# Patient Record
Sex: MALE | Race: WHITE | Employment: FULL TIME | URBAN - METROPOLITAN AREA
[De-identification: names, ages, dates, MRNs, and addresses within clinical notes are randomized per-mention and may not be internally consistent; named-entity substitution may affect disease eponyms.]

---

## 2019-05-15 ENCOUNTER — HOSPITAL ENCOUNTER (EMERGENCY)
Age: 54
Discharge: HOME | End: 2019-05-15
Attending: EMERGENCY MEDICINE
Payer: COMMERCIAL

## 2019-05-15 VITALS
HEIGHT: 75 IN | BODY MASS INDEX: 27.48 KG/M2 | SYSTOLIC BLOOD PRESSURE: 126 MMHG | HEART RATE: 75 BPM | DIASTOLIC BLOOD PRESSURE: 81 MMHG | RESPIRATION RATE: 18 BRPM | TEMPERATURE: 97.9 F | OXYGEN SATURATION: 92 % | WEIGHT: 221 LBS

## 2019-05-15 DIAGNOSIS — S05.02XA ABRASION OF LEFT CORNEA, INITIAL ENCOUNTER: Primary | ICD-10-CM

## 2019-05-15 PROCEDURE — 99282 EMERGENCY DEPT VISIT SF MDM: CPT

## 2019-05-15 PROCEDURE — 6370000000 HC RX 637 (ALT 250 FOR IP): Performed by: NURSE PRACTITIONER

## 2019-05-15 RX ORDER — OXYCODONE HYDROCHLORIDE AND ACETAMINOPHEN 5; 325 MG/1; MG/1
1 TABLET ORAL EVERY 6 HOURS PRN
Qty: 20 TABLET | Refills: 0 | Status: SHIPPED | OUTPATIENT
Start: 2019-05-15 | End: 2019-05-22

## 2019-05-15 RX ORDER — SULFACETAMIDE SODIUM 100 MG/ML
2 SOLUTION/ DROPS OPHTHALMIC
Qty: 1 BOTTLE | Refills: 0 | Status: SHIPPED | OUTPATIENT
Start: 2019-05-15 | End: 2019-05-25

## 2019-05-15 RX ORDER — OXYCODONE HYDROCHLORIDE AND ACETAMINOPHEN 5; 325 MG/1; MG/1
2 TABLET ORAL ONCE
Status: COMPLETED | OUTPATIENT
Start: 2019-05-15 | End: 2019-05-15

## 2019-05-15 RX ADMIN — OXYCODONE AND ACETAMINOPHEN 2 TABLET: 5; 325 TABLET ORAL at 21:40

## 2019-05-15 ASSESSMENT — VISUAL ACUITY
OS: 20/30
OD: 20/30
OU: 20/30

## 2019-05-15 ASSESSMENT — ENCOUNTER SYMPTOMS
PHOTOPHOBIA: 0
EYE REDNESS: 1
COLOR CHANGE: 0
EYE DISCHARGE: 0
EYE PAIN: 1
EYE ITCHING: 0

## 2019-05-15 ASSESSMENT — PAIN SCALES - GENERAL: PAINLEVEL_OUTOF10: 10

## 2019-05-15 ASSESSMENT — PAIN DESCRIPTION - PAIN TYPE: TYPE: ACUTE PAIN

## 2019-05-15 ASSESSMENT — PAIN DESCRIPTION - LOCATION: LOCATION: EYE

## 2019-05-15 ASSESSMENT — PAIN DESCRIPTION - ORIENTATION: ORIENTATION: LEFT

## 2019-05-16 NOTE — ED NOTES
Pt presents to ED ambulatory with steady gait c/o of poking himself with a stick to his left eye. Pt rates pain 10/10. Redness noted with clear, watery drainage.        Shahnaz Quinn RN  05/15/19 2121

## 2019-05-16 NOTE — ED PROVIDER NOTES
[facility] ED  eMERGENCY dEPARTMENT eNCOUnter      Pt Name: Reza Fuentes  MRN: [id number]  [place] 1965  Date of evaluation: 5/15/2019  Provider: WAGNER Lucero CNP    CHIEF COMPLAINT       Chief Complaint   Patient presents with    Eye Injury     left; stick into eye         HISTORY OF PRESENT ILLNESS  (Location/Symptom, Timing/Onset, Context/Setting, Quality, Duration, Modifying Factors, Severity.)   Reza Fuentes is a 46 y.o. male who presents to the emergency department via private auto for left eye pain. States he was picking up sticks in his yard when he accidentally poked himself in the eye with a stick. Reports mild blurred vision. Denies bleeding. A ride is present. States his tetanus status is up to date. Rates his pain 10/10 at this time. Nursing Notes were reviewed. ALLERGIES     Patient has no known allergies. CURRENT MEDICATIONS       Discharge Medication List as of 5/15/2019  9:30 PM          PAST MEDICAL HISTORY   History reviewed. No pertinent past medical history. SURGICAL HISTORY     History reviewed. No pertinent surgical history. FAMILY HISTORY     History reviewed. No pertinent family history. No family status information on file. SOCIAL HISTORY      reports that he has never smoked. He has never used smokeless tobacco. He reports that he drinks about 3.6 oz of alcohol per week. He reports that he has current or past drug history. Drug: Marijuana. REVIEW OF SYSTEMS    (2-9 systems for level 4, 10 or more for level 5)     Review of Systems   Constitutional: Negative for chills, diaphoresis, fatigue and fever. Eyes: Positive for pain, redness and visual disturbance. Negative for photophobia, discharge and itching. Musculoskeletal: Negative for myalgias and neck pain. Skin: Negative for color change, rash and wound. Neurological: Negative for dizziness and headaches.       Except as noted above the remainder of the review of heavy machinery while taking the percocet. Follow up with an ophthalmologist tomorrow, return to ED if condition worsens. FINAL IMPRESSION      1. Abrasion of left cornea, initial encounter            DISPOSITION/PLAN   DISPOSITION Decision To Discharge 05/15/2019 09:22:46 PM      PATIENT REFERRED TO:   Deanna Guerrero MD  [address] [id number] [facility]  [phone]    Schedule an appointment as soon as possible for a visit       [facility] ([place], [place]) ED  [address] [facility]  [phone]    If symptoms worsen, As needed      DISCHARGE MEDICATIONS:     Discharge Medication List as of 5/15/2019  9:30 PM      START taking these medications    Details   sulfacetamide (BLEPH-10) 10 % ophthalmic solution Place 2 drops into the left eye every 3 hours for 10 days, Disp-1 Bottle, R-0Print      oxyCODONE-acetaminophen (PERCOCET) 5-325 MG per tablet Take 1 tablet by mouth every 6 hours as needed for Pain (WARNING:  May cause drowsiness.  May impair ability to operate vehicles or machinery.  Do not use in combination with alcohol.) for up to 7 days. , Disp-20 tablet, R-0Print                 (Please note that portions of this note were completed with a voice recognition program.  Efforts were made to edit the dictations but occasionally words are mis-transcribed.)    WAGNER Bills CNP, APRN - CNP  05/15/19 9116

## 2023-07-26 ENCOUNTER — HOSPITAL ENCOUNTER (OUTPATIENT)
Dept: GENERAL RADIOLOGY | Age: 58
Discharge: HOME | End: 2023-07-28
Payer: COMMERCIAL

## 2023-07-26 ENCOUNTER — HOSPITAL ENCOUNTER (OUTPATIENT)
Age: 58
Discharge: HOME | End: 2023-07-26

## 2023-07-26 DIAGNOSIS — T14.90XA INJURY: ICD-10-CM

## 2023-07-26 PROCEDURE — 73110 X-RAY EXAM OF WRIST: CPT

## 2023-07-26 PROCEDURE — 73030 X-RAY EXAM OF SHOULDER: CPT

## 2023-07-26 PROCEDURE — 73502 X-RAY EXAM HIP UNI 2-3 VIEWS: CPT

## 2023-07-26 PROCEDURE — 73130 X-RAY EXAM OF HAND: CPT
